# Patient Record
Sex: FEMALE | Race: WHITE | Employment: PART TIME | URBAN - METROPOLITAN AREA
[De-identification: names, ages, dates, MRNs, and addresses within clinical notes are randomized per-mention and may not be internally consistent; named-entity substitution may affect disease eponyms.]

---

## 2017-12-20 ENCOUNTER — HOSPITAL ENCOUNTER (OUTPATIENT)
Dept: PHYSICAL THERAPY | Age: 52
Discharge: HOME | End: 2017-12-20
Payer: OTHER GOVERNMENT

## 2017-12-20 PROCEDURE — 97140 MANUAL THERAPY 1/> REGIONS: CPT

## 2017-12-20 PROCEDURE — 97162 PT EVAL MOD COMPLEX 30 MIN: CPT

## 2017-12-20 NOTE — PROGRESS NOTES
PHYSICAL THERAPY - DAILY TREATMENT NOTE    Patient Name: Joaquin Young        Date: 2017  : 1965   YES Patient  Verified  Visit #:     Insurance: Payor: [employer] / Plan: Katherine Reza RETIREES AND DEPENDENTS / Product Type:  /      In time: 1:30 Out time: 2:15   Total Treatment Time: 45     Medicare Time Tracking (below)   Total Timed Codes (min):  - 1:1 Treatment Time:  -     TREATMENT AREA =  L/S and C/S    SUBJECTIVE    Pain Level (on 0 to 10 scale):  5  / 10   Medication Changes/New allergies or changes in medical history, any new surgeries or procedures? NO    If yes, update Summary List   Subjective Functional Status/Changes:  []  No changes reported     See eval          OBJECTIVE    Modality rationale:  -    min [] Estim, type:                                          []  att     []  unatt     []  w/US     []  w/ice    []  w/heat    min []  Mechanical Traction: type/lbs                                               []  pro   []  sup   []  int   []  cont    min []  Ultrasound, settings/location:      min []  Iontophoresis:  []  take home patch w/ dexamethazone    min                                []  in clinic w/ dexamethazone    min []  Ice     []  Heat     position:     min []  Other:      - min Therapeutic Exercise:  [x]  See flow sheet   []  Other:      []  Added:     to improve (function):    []  Changed:     to improve (function):       min Therapeutic Activity:      15 min Manual Therapy:  MET (to attempt to correct R ant innominate), DTM to C/S, passive sustained flex, TrP release to R rhomboid        min Gait Training:       min Patient Education:  YES  Reviewed HEP   []  Progressed/Changed HEP based on:         Other Objective/Functional Measures:    See eval     Post Treatment Pain Level (on 0 to 10) scale:   4  / 10     ASSESSMENT    []  See Progress Note/Recertification   Patient will continue to benefit from skilled therapy to address remaining functional deficits: see eval   Progress toward goals / Updated goals:    -     PLAN    [x]  Upgrade activities as tolerated YES Continue plan of care   []  Discharge due to :    []  Other:      Therapist: Ruth Mejia PT    Date: 12/20/2017 Time: 2:47 PM

## 2017-12-20 NOTE — PROGRESS NOTES
[facility] PHYSICAL THERAPY AT [facility] [address], [address] - Phone: [phone]  Fax: [phone] / [address] [facility] [address] [facility]  Patient Name: Kelley Brar : 1965   Medical   Diagnosis: Radiculopathy, cervical region [M54.12]  Radiculopathy, lumbar region [M54.16] Treatment Diagnosis: R C/S radic, LBP   Onset Date: Chronic     Referral Source: Gabriela Xie MD [facility]): 2017   Prior Hospitalization: See medical history Provider #: [id number]   Prior Level of Function: Chronic LBP and joint pain   Comorbidities: Fibromyalgia, Depression   Medications: Verified on Patient Summary List   The Plan of Care and following information is based on the information from the initial evaluation.   ==============================================================================  Assessment / key information:   Kelley Brar is a 47 y.o. female with a chief c/o chronic R LBP. She reports recent aggravation of acute R LBP with added weight with deadlifts, at the gym. She reports > 30 years of R LBP and R LE pain, as distal as her R calf. She reports that her R LE pain is constant. This recent injury has not changed her radic symptoms, and has now has negative LE nerve tension tests. Mechanical exam was inconclusive. Signs and symptoms suggest R SI dysfunction (ant innominate), with pain with rolling over and transfers, R LE longer in supine, R ASIS lower and R PSIS higher. Her LBP is as high as 9/10 and constant. The patient also reports insidious onset of R neck pain and scapular pain, as distal as her R upper arm. SHe denies any sensation changesd and her UE MMT was normal.  Her C/S AROM was fairly normal, with only R rotation limited (52 degrees). SHe does have some TrPs in her R upper Rhomboid. Passive sustained C/S flexion did seem to diminish R scapular pain.   Her neck pain is keeping her from getting proper sleep at night. The patient is generally hypermobile. Her only dysfunctional multi-segmental movement is R rotation. The patient would benefit from skilled physical therapy at this time.  ===========================================================================================  Eval Complexity: History MEDIUM  Complexity : 1-2 comorbidities / personal factors will impact the outcome/ POC ;  Examination  HIGH Complexity : 4+ Standardized tests and measures addressing body structure, function, activity limitation and / or participation in recreation ; Presentation MEDIUM Complexity : Evolving with changing characteristics ; Decision Making MEDIUM Complexity : FOTO score of 26-74; Overall Complexity MEDIUM  Problem List: pain affecting function, decrease ROM, decrease strength, decrease ADL/ functional abilitiies, decrease activity tolerance and decrease flexibility/ joint mobility   Treatment Plan may include any combination of the following: Therapeutic exercise, Therapeutic activities, Neuromuscular re-education, Physical agent/modality, Manual therapy and Patient education. Dry needling. Patient / Family readiness to learn indicated by: asking questions, trying to perform skills and interest  Persons(s) to be included in education: patient (P)  Barriers to Learning/Limitations: None  Measures taken:    Patient Goal (s): \"to be able to sleep without pain\"   Patient self reported health status: good  Rehabilitation Potential: good   Short Term Goals: To be accomplished in  2  weeks:  1. Decrease max pain to < 4/10    Long Term Goals: To be accomplished in  4-5  weeks:  1. All ADL's without LBP> 2/10  2. Pt able to maintain correct hip alingement  3. Pt able to sleep thru the night without being awakened by neck pain  4.   Functional pain free R multi-segmental rotation  Frequency / Duration:   Patient to be seen 2-3  times per week for 4-8  weeks:  Patient / Caregiver education and instruction: self care, activity modification and exercises  Therapist Signature: Jennifer Talavera PT, MDT Date: 86/73/6024   Certification Period: NA Time: 2:52 PM   ===========================================================================================  I certify that the above Physical Therapy Services are being furnished while the patient is under my care. I agree with the treatment plan and certify that this therapy is necessary. Physician Signature:        Date:       Time:     Please sign and return to [facility] or you may fax the signed copy to [phone]. Thank you.

## 2017-12-27 ENCOUNTER — HOSPITAL ENCOUNTER (OUTPATIENT)
Dept: PHYSICAL THERAPY | Age: 52
Discharge: HOME | End: 2017-12-27
Payer: OTHER GOVERNMENT

## 2017-12-27 PROCEDURE — 97110 THERAPEUTIC EXERCISES: CPT

## 2017-12-27 PROCEDURE — 97140 MANUAL THERAPY 1/> REGIONS: CPT

## 2017-12-29 ENCOUNTER — HOSPITAL ENCOUNTER (OUTPATIENT)
Dept: PHYSICAL THERAPY | Age: 52
Discharge: HOME | End: 2017-12-29
Payer: OTHER GOVERNMENT

## 2017-12-29 PROCEDURE — 97110 THERAPEUTIC EXERCISES: CPT

## 2017-12-29 PROCEDURE — 97140 MANUAL THERAPY 1/> REGIONS: CPT

## 2017-12-29 NOTE — PROGRESS NOTES
PHYSICAL THERAPY - DAILY TREATMENT NOTE    Patient Name: Cheryl Connelly        Date: 2017  : 1965    Patient  Verified: YES  Visit #:   3   of   12  Insurance: Payor:  / Plan: Elvisbrigitte [employer] DEPENDENTS / Product Type:  /      In time: 2:35 Out time: 3:25   Total Treatment Time: 50     Medicare Time Tracking (below)   Total Timed Codes (min):  - 1:1 Treatment Time:  -     TREATMENT AREA/ DIAGNOSIS = Radiculopathy, cervical region [M54.12]  Radiculopathy, lumbar region [M54.16]    SUBJECTIVE  Pain Level (on 0 to 10 scale):  5  / 10   Medication Changes/New allergies or changes in medical history, any new surgeries or procedures?     NO    If yes, update Summary List   Subjective Functional Status/Changes:  []  No changes reported     Functional improvement I felt a little better after the last visit   Functional limitation       OBJECTIVE  Modalities Rationale:     decrease edema, decrease inflammation, decrease pain and increase tissue extensibility to improve patient's ability to perform ADLs without pain   min [] Estim, type/location:                                      []  att     []  unatt     []  w/US     []  w/ice    []  w/heat    min []  Mechanical Traction: type/lbs                   []  pro   []  sup   []  int   []  cont    []  before manual    []  after manual    min []  Ultrasound, settings/location:      min []  Iontophoresis w/ dexamethasone, location:                                               []  take home patch       []  in clinic   10 min [x]  Ice     [x]  Heat    location/position: Ice to L/S and heat to C/S    min []  Vasopneumatic Device, press/temp:     min []  Other:    [] Skin assessment post-treatment (if applicable):    []  intact    []  redness- no adverse reaction     []redness  adverse reaction:        30 min Manual Therapy: DTM/TrP release to C/S and low back, GD IV PA mobs to T/S and C/S, passive C/S sustained flexion, MET to correct R ant innominate? L post innominate   Rationale:      decrease pain, increase ROM and increase tissue extensibility to improve patient's ability to perform ADLs without pain    10 min Therapeutic Exercise:  [x]  See flow sheet   Rationale:      increase ROM and increase strength to improve the patients ability to perform ADLs without pain          min Patient Education:  Yes    [x] Reviewed HEP   []  Progressed/Changed HEP based on: Other Objective/Functional Measures:    Pt with R UE pain as distal as her R hand, reduced with passive C/S flexion  Pt advised to use C/S flexion to reduce R UE/shoulder pain and to avoid any C/S ext or R lat flex  Pt with R LBP/hip pain, in R SI region, with R ant innominate, with elevated R ASIS and lower R PSIS, partially corrected with MET     Post Treatment Pain Level (on 0 to 10) scale:   2  / 10     ASSESSMENT  Assessment/Changes in Function:     Less pain after therapy  pt with R ant innominate and R C/S stenosis     []  See Progress Note/Recertification   Patient will continue to benefit from skilled PT services to modify and progress therapeutic interventions, address functional mobility deficits, address ROM deficits, address strength deficits, analyze and address soft tissue restrictions, analyze and cue movement patterns and analyze and modify body mechanics/ergonomics to attain remaining goals.    Progress toward goals / Updated goals:    -     PLAN  [x]  Upgrade activities as tolerated YES Continue plan of care   []  Discharge due to :    []  Other:      Therapist: Arleen Worrell PT    Date: 12/29/2017 Time: 3:27 PM        Future Appointments  Date Time Provider Mickey Baker   1/3/2018 5:00 PM Sheila Murdock PT [facility] AT [facility]   1/4/2018 5:00 PM Arleen Worrell PT [facility] AT [facility]   1/8/2018 4:30 PM Jonah Roblesa [facility] AT [facility]   1/10/2018 4:30 PM Sheila Murdock PT [facility] AT [facility]   1/15/2018 4:30 PM Sheila Murdock PT [facility] AT [facility]   1/17/2018 4:30 PM Sheila Murdock PT [facility] AT [facility]

## 2018-01-03 ENCOUNTER — HOSPITAL ENCOUNTER (OUTPATIENT)
Dept: PHYSICAL THERAPY | Age: 53
Discharge: HOME | End: 2018-01-03
Payer: OTHER GOVERNMENT

## 2018-01-03 PROCEDURE — 97110 THERAPEUTIC EXERCISES: CPT

## 2018-01-03 PROCEDURE — 97140 MANUAL THERAPY 1/> REGIONS: CPT

## 2018-01-03 PROCEDURE — 97124 MASSAGE THERAPY: CPT

## 2018-01-03 NOTE — PROGRESS NOTES
PHYSICAL THERAPY - DAILY TREATMENT NOTE  8-    Patient Name: Andree Gold        Date: 1/3/2018  : 1965   YES Patient  Verified  Visit #:   4   of   8  Insurance: Payor:  / Plan: Ki Mello AND DEPENDENTS / Product Type:  /      In time: 4:50 Out time: 5:30   Total Treatment Time: 40     Medicare Time Tracking (below)   Total Timed Codes (min):  n/a 1:1 Treatment Time:  n/a     TREATMENT AREA =  Radiculopathy, cervical region [M54.12]  Radiculopathy, lumbar region [M54.16]    SUBJECTIVE    Pain Level (on 0 to 10 scale):  4  / 10   Medication Changes/New allergies or changes in medical history, any new surgeries or procedures?     NO    If yes, update Summary List   Subjective Functional Status/Changes:  []  No changes reported       Functional improvements: none reported  Functional impairments: ADLs         OBJECTIVE  Modalities Rationale:     decrease pain and increase tissue extensibility to improve patient's ability to complete ADLs      min [] Estim, type/location:                                      []  att     []  unatt     []  w/US     []  w/ice    []  w/heat    min []  Mechanical Traction: type/lbs                   []  pro   []  sup   []  int   []  cont    []  before manual    []  after manual    min []  Ultrasound, settings/location:      min []  Iontophoresis w/ dexamethasone, location:                                               []  take home patch       []  in clinic   10 min []  Ice     [x]  Heat    location/position: Supine cervical and lumbar    min []  Vasopneumatic Device, press/temp:     min []  Other:    [x] Skin assessment post-treatment (if applicable):    [x]  intact    []  redness- no adverse reaction     []redness - adverse reaction:      10 min Therapeutic Exercise:  [x]  See flow sheet   Rationale:      increase ROM and increase strength to improve the patients ability to complete ADLs     20 min Manual Therapy: Technique:      [] S/DTM []IASTM []PROM [] Passive Stretching   []manual TPR    []Jt manipulation:Gr I [] II []  III [] IV[] V[]  Treatment Area:  SOR,DTM cervical paraspinals, UT, prone lumbar paraspinals,piriformis   Rationale:      decrease pain, increase ROM and increase tissue extensibility to improve patient's ability to complete ADLs     min Patient Education:  YES  Reviewed HEP   []  Progressed/Changed HEP based on: Other Objective/Functional Measures:    Patient reporting diffuse pain with all therex. Unable to centralize symptoms. Post Treatment Pain Level (on 0 to 10) scale:   3  / 10     ASSESSMENT    Assessment/Changes in Function:     Limited progress due to pain. []  See Progress Note/Recertification   Patient will continue to benefit from skilled PT services to modify and progress therapeutic interventions, address functional mobility deficits, address ROM deficits, address strength deficits, analyze and address soft tissue restrictions, analyze and cue movement patterns, analyze and modify body mechanics/ergonomics and assess and modify postural abnormalities to attain remaining goals. to attain remaining goals. Progress toward goals / Updated goals:    Progress limited due to pain.      PLAN    [x]  Upgrade activities as tolerated YES Continue plan of care   []  Discharge due to :    []  Other:      Therapist: Maggi Carlton PT    Date: 1/3/2018 Time: 5:09 PM   Future Appointments  Date Time Provider Mickey Baker   1/4/2018 5:00 PM Mami Oquendo PT [facility]   1/8/2018 4:30 PM Linsey Jang [facility]   1/10/2018 4:30 PM Maggi Carlton PT [facility] AT [facility]   1/15/2018 4:30 PM Maggi Carlton PT [facility] AT [facility]   1/17/2018 4:30 PM Maggi Carlton PT [facility] AT [facility]

## 2018-01-08 ENCOUNTER — HOSPITAL ENCOUNTER (OUTPATIENT)
Dept: PHYSICAL THERAPY | Age: 53
Discharge: HOME | End: 2018-01-08
Payer: OTHER GOVERNMENT

## 2018-01-08 PROCEDURE — 97140 MANUAL THERAPY 1/> REGIONS: CPT

## 2018-01-08 PROCEDURE — 97110 THERAPEUTIC EXERCISES: CPT

## 2018-01-09 NOTE — PROGRESS NOTES
PHYSICAL THERAPY - DAILY TREATMENT NOTE  -    Patient Name: Eduarda Roldan        Date: 2018  : 1965   YES Patient  Verified  Visit #:   5   of   8  Insurance: Payor:  / Plan: [facility]  RETIREES AND DEPENDENTS / Product Type:  /      In time: 4:30 Out time: 5:35   Total Treatment Time: 45       TREATMENT AREA = Radiculopathy, cervical region [M54.12]  Radiculopathy, lumbar region [M54.16]    SUBJECTIVE    Pain Level (on 0 to 10 scale):  4- 10   Medication Changes/New allergies or changes in medical history, any new surgeries or procedures? NO    If yes, update Summary List   Subjective Functional Status/Changes:  []  No changes reported     R post scap and lateral UE pain to wrist. R LBP radiating to lateral hip       OBJECTIVE    20 min Therapeutic Exercise:  [x]  See flow sheet   Rationale:      increase strength to improve the patients ability to walk for prolonged periods       25 min Manual Therapy:  B c/s paraspinal/SCM/scalene STM. L C3-C7 lateral glies. PROM R c/s rot.  R l/s paraspinal and piriformis STM   Rationale:      decrease pain and increase ROM to improve patient's ability to perform all ADL's      Modalities Rationale:     decrease pain to improve patient's ability to perform all ADL's without pain      min [] Estim, type/location:                                      []  att     []  unatt     []  w/US     []  w/ice    []  w/heat    min []  Mechanical Traction: type/lbs                   []  pro   []  sup   []  int   []  cont    []  before manual    []  after manual    min []  Ultrasound, settings/location:      min []  Iontophoresis w/ dexamethasone, location:                                               []  take home patch       []  in clinic   10 min []  Ice     [x]  Heat    location/position: Neck, back/supine    min []  Vasopneumatic Device, press/temp:     min []  Other:    [x] Skin assessment post-treatment (if applicable):    [x]  intact    [x] redness- no adverse reaction     []redness - adverse reaction:      Throughout Tx min Patient Education:  YES  Reviewed HEP   []  Progressed/Changed HEP based on: Other Objective/Functional Measures:    R PSIS higher in prone, R ASIS lower in supine     Post Treatment Pain Level (on 0 to 10) scale:   2-3  / 10     ASSESSMENT    Assessment/Changes in Function:     MET's (R HS, L hip flexor): R ASIS = L in supine     []  See Progress Note/Recertification   Patient will continue to benefit from skilled PT services to modify and progress therapeutic interventions, address functional mobility deficits, address ROM deficits, address strength deficits, analyze and address soft tissue restrictions, analyze and cue movement patterns, analyze and modify body mechanics/ergonomics and assess and modify postural abnormalities to attain remaining goals.    Progress toward goals / Updated goals:    Continue strengthening to meet all goals     PLAN    [x]  Upgrade activities as tolerated YES Continue plan of care   []  Discharge due to :    []  Other:      Therapist: Kourtney Moncada PT    Date: 1/8/2018 Time: 7:07 PM   Future Appointments  Date Time Provider Mickey Baker   1/10/2018 4:30 PM Eric Keller [facility]   1/15/2018 4:30 PM Orlando Burnett PT [facility] AT [facility]   1/17/2018 4:30 PM Orlando Burnett, PT [facility] AT [facility]

## 2018-01-10 ENCOUNTER — HOSPITAL ENCOUNTER (OUTPATIENT)
Dept: PHYSICAL THERAPY | Age: 53
Discharge: HOME | End: 2018-01-10
Payer: OTHER GOVERNMENT

## 2018-01-10 PROCEDURE — 97140 MANUAL THERAPY 1/> REGIONS: CPT

## 2018-01-10 PROCEDURE — 97110 THERAPEUTIC EXERCISES: CPT

## 2018-01-17 ENCOUNTER — HOSPITAL ENCOUNTER (OUTPATIENT)
Dept: PHYSICAL THERAPY | Age: 53
Discharge: HOME | End: 2018-01-17
Payer: OTHER GOVERNMENT

## 2018-01-17 PROCEDURE — 97110 THERAPEUTIC EXERCISES: CPT

## 2018-01-17 PROCEDURE — 97140 MANUAL THERAPY 1/> REGIONS: CPT

## 2018-01-17 NOTE — PROGRESS NOTES
PHYSICAL THERAPY - DAILY TREATMENT NOTE    Patient Name: Tam Williamson        Date: 2018  : 1965    Patient  Verified: YES  Visit #:   7   of   12  Insurance: Payor:  / Plan: [employer] AND DEPENDENTS / Product Type:  /      In time: 2:15 Out time: 3   Total Treatment Time: 45     Medicare Time Tracking (below)   Total Timed Codes (min):  - 1:1 Treatment Time:  -     TREATMENT AREA/ DIAGNOSIS = Radiculopathy, cervical region [M54.12]  Radiculopathy, lumbar region [M54.16]    SUBJECTIVE  Pain Level (on 0 to 10 scale):  5  / 10   Medication Changes/New allergies or changes in medical history, any new surgeries or procedures?     NO    If yes, update Summary List   Subjective Functional Status/Changes:  []  No changes reported     Functional improvement I feel better after therapy   Functional limitation no better overall      OBJECTIVE  Modalities Rationale:     decrease pain and increase tissue extensibility to improve patient's ability to perform ADLs without pain   min [] Estim, type/location:                                      []  att     []  unatt     []  w/US     []  w/ice    []  w/heat    min []  Mechanical Traction: type/lbs                   []  pro   []  sup   []  int   []  cont    []  before manual    []  after manual    min []  Ultrasound, settings/location:      min []  Iontophoresis w/ dexamethasone, location:                                               []  take home patch       []  in clinic   5 min []  Ice     [x]  Heat    location/position: To C/S    min []  Vasopneumatic Device, press/temp:     min []  Other:    [] Skin assessment post-treatment (if applicable):    []  intact    []  redness- no adverse reaction     []redness - adverse reaction:        25 min Manual Therapy: DTM to C/S, B trap stretch using scap depression and minimal lat flex, MET to correct R ant innominate    Rationale:      decrease pain, increase ROM and increase tissue extensibility to improve patient's ability to perform ADLs without pain    15 min Therapeutic Exercise:  [x]  See flow sheet   Rationale:      increase ROM and increase strength to improve the patients ability to perform ADLs without pain          min Patient Education:  Yes    [x] Reviewed HEP   []  Progressed/Changed HEP based on: Other Objective/Functional Measures:    Pt with neck pain and possible stenotic pain in R UE  Also pt with R ant innominate, corrected with MET  Pt advised to push R foot into door jam 3 x 10 sec, in supine with R hip in 90/90 position to correct  R hip innominate      Post Treatment Pain Level (on 0 to 10) scale:   5  / 10     ASSESSMENT  Assessment/Changes in Function:     Pt aggravates pain at gym, advised to back off gym intensity     []  See Progress Note/Recertification   Patient will continue to benefit from skilled PT services to modify and progress therapeutic interventions, address functional mobility deficits, address ROM deficits, address strength deficits, analyze and address soft tissue restrictions, analyze and cue movement patterns and analyze and modify body mechanics/ergonomics to attain remaining goals. Progress toward goals / Updated goals:    No progress long term yet     PLAN  [x]  Upgrade activities as tolerated YES Continue plan of care   []  Discharge due to :    []  Other:      Therapist: Gabo Cook PT    Date: 1/17/2018 Time: 4:17 PM        No future appointments.

## 2018-01-17 NOTE — PROGRESS NOTES
[address] PHYSICAL THERAPY AT [facility] 93. [place], [address]  Phone: [phone]  Fax: [phone]  PROGRESS NOTE  Patient Name: Cheryl Connelly : 1965   Treatment/Medical Diagnosis: Radiculopathy, cervical region [M54.12]  Radiculopathy, lumbar region [M54.16]   Referral Source: Wilman Covarrubias MD     Date of Initial Visit: 17 Attended Visits: 7 Missed Visits: 2     SUMMARY OF TREATMENT  Manual therapy, including massage and PROM to C/S.  MET to correct R ant innominate. Gentle strengthening. Education on posture and self care. CURRENT STATUS  The patient has not made any progress to date. She reports some pain relief with each visit. She has signs and symptoms consistent with R C/S foraminal stenosis. She also has a chronic R ant innominate that we can correct, but does not maintain the corrected position. Goal/Measure of Progress Goal Met? 1. All ADLs without LBP > 2/10   Status at last Eval: LBP up to 9/10 Current Status: LBP up to 7/10 progressing   2. Able to maintain correct hip alingement    Status at last Eval: R ant innominate Current Status: Unable to maintain innominate no   3. Pt able to sleep thru the night without neck pain   Status at last Eval: Poor sleep due to neck pain Current Status: poor sleep due to neck pain no   4. Pain free R multi-segmental rotation   Status at last Eval: Dysfunctional painful R multi-segmental rotation  Current Status: Functional pain free R MSR yes     New Goals to be achieved in __4-5__  weeks:  1. Maintain correct pelvic alingement for > 2 weeks   2. All ADLs without LBP > 2/10    3. Pt able to sleep thru the night without neck pain    4.  -   RECOMMENDATIONS  Continue PT 2x/week x 4-5 weeks   If you have any questions/comments please contact us directly at [phone]. Thank you for allowing us to assist in the care of your patient. Therapist Signature:  Neto Tracy PAUL Storm, MDT Date: 1/17/2018     Time: 4:25 PM   NOTE TO PHYSICIAN:  PLEASE COMPLETE THE ORDERS BELOW AND FAX TO   [facility] Physical Therapy at [place]: [phone]. If you are unable to process this request in 24 hours please contact our office: [phone].    ___ I have read the above report and request that my patient continue as recommended.   ___ I have read the above report and request that my patient continue therapy with the following changes/special instructions:_________________________________________________________   ___ I have read the above report and request that my patient be discharged from therapy.      Physician Signature:        Date:       Time:

## 2018-03-13 NOTE — PROGRESS NOTES
[facility] PHYSICAL THERAPY AT [facility] 93. [address] [place] [address] Drive, [address] [place] [address]  Phone: [phone]  Fax: [phone]  DISCHARGE NOTE  Patient Name: Patrick Orantes : 1965   Treatment/Medical Diagnosis: Radiculopathy, cervical region [M54.12]  Radiculopathy, lumbar region [M54.16]   Referral Source: Sravani Pepe MD     Date of Initial Visit: 17 Attended Visits: 7 Missed Visits: 2     SUMMARY OF TREATMENT  Manual therapy, including massage and PROM to C/S.  MET to correct R ant innominate. Gentle strengthening. Education on posture and self care. CURRENT STATUS  The patient has not returned since her 18 reassessment, and has been discharged. RECOMMENDATIONS  DC patient due to non attendance. If you have any questions/comments please contact us directly at [phone]. Thank you for allowing us to assist in the care of your patient. Therapist Signature:  Nupur Gutierrez PT, MDT Date: 3/13/2018     Time: 4:25 PM

## 2024-01-11 ENCOUNTER — HOSPITAL ENCOUNTER (OUTPATIENT)
Facility: HOSPITAL | Age: 59
Setting detail: RECURRING SERIES
Discharge: HOME | End: 2024-01-14
Payer: COMMERCIAL

## 2024-01-11 PROCEDURE — 97161 PT EVAL LOW COMPLEX 20 MIN: CPT

## 2024-01-11 NOTE — THERAPY EVALUATION
potential as she has strong familial support from her daughter and is motivated to return to recreational and work activities without pain.   Not post operative    OBJECTIVE shoulder:   Posture/Positioning: Preferred slump and FHRS, but variable.   Cervical Screen: WFL    Standing Shoulder AROM:  Flexion: L: 173 deg,  R: 164 deg  ABD: L: 173 deg,  R: 170 deg  Functional ER: L: T5,  R: T3  Functional IR: L: T6,  R: T6    Shoulder Strength:  Flexion: L: 4/5 R: 4-/5   ABD:  L: 4+/5 R: 4-/5  Shoulder Ext: 5/5 bilat  ER: L: 4+/5 R: 4-/5  IR:  L: 5/5 R: 4+/5  Elbow Flex: 5/5 bilat  Elbow Ext: L: 5/5, R: 4/5    Palpation: TTP to lateral scapular boarder, teres minor and subscap  HEP: TC    Evaluation Complexity:  History:  MEDIUM  Complexity : 1-2 comorbidities / personal factors will impact the outcome/ POC ; Examination:  MEDIUM Complexity : 3 Standardized tests and measures addressin body structure, function, activity limitation and / or participation in recreation  ;Presentation:  LOW Complexity : Stable, uncomplicated  ;Clinical Decision Making:  MEDIUM Complexity : FOTO score of 26-74 FOTO score = an established functional score where 100 = no disability  Overall Complexity Rating: LOW   Problem List: pain affecting function, decrease ROM, decrease strength, decrease ADL/functional abilities, and decrease activity tolerance   Treatment Plan may include any combination of the followin Therapeutic Exercise, 08289 Neuromuscular Re-Education, 36147 Manual Therapy, 21931 Therapeutic Activity, 92014 Self Care/Home Management, 73298 Electrical Stim unattended, 61072 Electrical Stim attended, 98811 Ultrasound, and (Elective Self Pay) Needle Insertion w/o Injection (1 or 2 muscles), (3+ muscles)  Patient / Family readiness to learn indicated by: asking questions, trying to perform skills, interest, return verbalization , and return demonstration   Persons(s) to be included in education: patient (P)  Barriers to

## 2024-01-11 NOTE — PROGRESS NOTES
PHYSICAL THERAPY - DAILY TREATMENT NOTE    Patient Name: April Roberts    Date: 2024    : 1965  Insurance: Payor: DENYS / Plan: [employer] [place] [facility] / Product Type: *No Product type* /      Patient  verified YES   Visit #   Current / Total 1 4   Time   In / Out 3:40 4:20   Pain   In / Out 0 0   Subjective Functional Status/Changes: SEE EVALUATION     TREATMENT AREA =  M25.511  RIGHT SHOULDER PAIN     OBJECTIVE        Therapeutic Procedures:  Tx Min Billable or 1:1 Min (if diff from Tx Min) Procedure, Rationale, Specifics   NB  90996 Self Care/Home Management (timed):  improve patient knowledge and understanding of activity modification, diagnosis/prognosis, and physical therapy expectations, procedures and progression  to improve patient's ability to progress to PLOF and address remaining functional goals.  (see flow sheet as applicable)     Details if applicable:       -       Details if applicable:            Details if applicable:            Details if applicable:            Details if applicable:     NB 40 [facility] Totals Reminder: bill using total billable min of TIMED therapeutic procedures (example: do not include dry needle or estim unattended, both untimed codes, in totals to left)  8-22 min = 1 unit; 23-37 min = 2 units; 38-52 min = 3 units; 53-67 min = 4 units; 68-82 min = 5 units   Total Total     [x]  Patient Education billed concurrently with other procedures   [x] Review HEP    [] Progressed/Changed HEP, detail:    [] Other detail:       Objective Information/Functional Measures/Assessment    SEE EVALUATION    Patient will benefit from skilled PT services to  modify and progress therapeutic interventions, analyze and address functional mobility deficits, analyze and address ROM deficits, analyze and address strength deficits, analyze and address soft tissue restrictions, analyze and cue for proper movement patterns, and analyze and modify for postural abnormalities to address functional

## 2024-03-26 ENCOUNTER — TELEPHONE (OUTPATIENT)
Facility: HOSPITAL | Age: 59
End: 2024-03-26

## 2024-03-26 NOTE — TELEPHONE ENCOUNTER
Patient was advised FO scheduled in error the 3/26 appt & it needed to be canceled but could be R/S as an eval. Patient became upset as she wasn't aware of D/C and stated anything else we need to text or email her & ended call